# Patient Record
Sex: FEMALE | Race: WHITE | Employment: FULL TIME | URBAN - METROPOLITAN AREA
[De-identification: names, ages, dates, MRNs, and addresses within clinical notes are randomized per-mention and may not be internally consistent; named-entity substitution may affect disease eponyms.]

---

## 2022-04-18 LAB
ANTIBODY SCREEN, EXTERNAL: NEGATIVE
CHLAMYDIA, EXTERNAL: NEGATIVE
HBSAG, EXTERNAL: NEGATIVE
HEPATITIS C AB,   EXT: NON REACTIVE
HGB, EXTERNAL: 1
HIV, EXTERNAL: NON REACTIVE
N. GONORRHEA, EXTERNAL: NEGATIVE
PAP SMEAR, EXTERNAL: NORMAL
RUBELLA, EXTERNAL: NORMAL
T. PALLIDUM, EXTERNAL: NON REACTIVE
TYPE, ABO & RH, EXTERNAL: NORMAL

## 2022-10-17 LAB — GRBS, EXTERNAL: NEGATIVE

## 2022-11-16 ENCOUNTER — HOSPITAL ENCOUNTER (INPATIENT)
Age: 32
LOS: 3 days | Discharge: HOME | End: 2022-11-19
Attending: STUDENT IN AN ORGANIZED HEALTH CARE EDUCATION/TRAINING PROGRAM | Admitting: OBSTETRICS & GYNECOLOGY
Payer: COMMERCIAL

## 2022-11-16 PROBLEM — O48.0 POST-DATES PREGNANCY: Status: ACTIVE | Noted: 2022-11-16

## 2022-11-16 LAB
AMPHET UR QL SCN: NEGATIVE
BARBITURATES UR QL SCN: NEGATIVE
BASOPHILS # BLD: 0 K/UL (ref 0–0.1)
BASOPHILS NFR BLD: 0 % (ref 0–1)
BENZODIAZ UR QL: NEGATIVE
CANNABINOIDS UR QL SCN: NEGATIVE
COCAINE UR QL SCN: NEGATIVE
DIFFERENTIAL METHOD BLD: ABNORMAL
DRUG SCRN COMMENT,DRGCM: NORMAL
EOSINOPHIL # BLD: 0 K/UL (ref 0–0.4)
EOSINOPHIL NFR BLD: 0 % (ref 0–7)
ERYTHROCYTE [DISTWIDTH] IN BLOOD BY AUTOMATED COUNT: 13 % (ref 11.5–14.5)
HCT VFR BLD AUTO: 34.4 % (ref 35–47)
HGB BLD-MCNC: 11.2 G/DL (ref 11.5–16)
IMM GRANULOCYTES # BLD AUTO: 0 K/UL (ref 0–0.04)
IMM GRANULOCYTES NFR BLD AUTO: 0 % (ref 0–0.5)
LYMPHOCYTES # BLD: 1.6 K/UL (ref 0.8–3.5)
LYMPHOCYTES NFR BLD: 16 % (ref 12–49)
MCH RBC QN AUTO: 27.2 PG (ref 26–34)
MCHC RBC AUTO-ENTMCNC: 32.6 G/DL (ref 30–36.5)
MCV RBC AUTO: 83.5 FL (ref 80–99)
METHADONE UR QL: NEGATIVE
MONOCYTES # BLD: 0.5 K/UL (ref 0–1)
MONOCYTES NFR BLD: 5 % (ref 5–13)
NEUTS SEG # BLD: 7.9 K/UL (ref 1.8–8)
NEUTS SEG NFR BLD: 79 % (ref 32–75)
NRBC # BLD: 0 K/UL (ref 0–0.01)
NRBC BLD-RTO: 0 PER 100 WBC
OPIATES UR QL: NEGATIVE
PCP UR QL: NEGATIVE
PLATELET # BLD AUTO: 146 K/UL (ref 150–400)
PMV BLD AUTO: 12.7 FL (ref 8.9–12.9)
RBC # BLD AUTO: 4.12 M/UL (ref 3.8–5.2)
WBC # BLD AUTO: 10.1 K/UL (ref 3.6–11)

## 2022-11-16 PROCEDURE — 86923 COMPATIBILITY TEST ELECTRIC: CPT

## 2022-11-16 PROCEDURE — 36415 COLL VENOUS BLD VENIPUNCTURE: CPT

## 2022-11-16 PROCEDURE — 86900 BLOOD TYPING SEROLOGIC ABO: CPT

## 2022-11-16 PROCEDURE — 74011250637 HC RX REV CODE- 250/637: Performed by: STUDENT IN AN ORGANIZED HEALTH CARE EDUCATION/TRAINING PROGRAM

## 2022-11-16 PROCEDURE — 80307 DRUG TEST PRSMV CHEM ANLYZR: CPT

## 2022-11-16 PROCEDURE — 75410000002 HC LABOR FEE PER 1 HR

## 2022-11-16 PROCEDURE — 85025 COMPLETE CBC W/AUTO DIFF WBC: CPT

## 2022-11-16 PROCEDURE — 65410000002 HC RM PRIVATE OB

## 2022-11-16 RX ORDER — SODIUM CHLORIDE 0.9 % (FLUSH) 0.9 %
5-40 SYRINGE (ML) INJECTION EVERY 8 HOURS
Status: DISCONTINUED | OUTPATIENT
Start: 2022-11-16 | End: 2022-11-17 | Stop reason: SDUPTHER

## 2022-11-16 RX ORDER — SODIUM CHLORIDE 0.9 % (FLUSH) 0.9 %
5-40 SYRINGE (ML) INJECTION AS NEEDED
Status: DISCONTINUED | OUTPATIENT
Start: 2022-11-16 | End: 2022-11-17 | Stop reason: SDUPTHER

## 2022-11-16 RX ORDER — ACETAMINOPHEN 325 MG/1
650 TABLET ORAL
Status: DISCONTINUED | OUTPATIENT
Start: 2022-11-16 | End: 2022-11-17 | Stop reason: SDUPTHER

## 2022-11-16 RX ORDER — LORAZEPAM 0.5 MG/1
0.5 TABLET ORAL ONCE
Status: COMPLETED | OUTPATIENT
Start: 2022-11-16 | End: 2022-11-16

## 2022-11-16 RX ORDER — OXYTOCIN/RINGER'S LACTATE 30/500 ML
87.3 PLASTIC BAG, INJECTION (ML) INTRAVENOUS AS NEEDED
Status: DISCONTINUED | OUTPATIENT
Start: 2022-11-16 | End: 2022-11-17 | Stop reason: SDUPTHER

## 2022-11-16 RX ORDER — OXYTOCIN/RINGER'S LACTATE 30/500 ML
10 PLASTIC BAG, INJECTION (ML) INTRAVENOUS AS NEEDED
Status: DISCONTINUED | OUTPATIENT
Start: 2022-11-16 | End: 2022-11-17 | Stop reason: SDUPTHER

## 2022-11-16 RX ORDER — ONDANSETRON 2 MG/ML
4 INJECTION INTRAMUSCULAR; INTRAVENOUS
Status: DISCONTINUED | OUTPATIENT
Start: 2022-11-16 | End: 2022-11-18

## 2022-11-16 RX ADMIN — LORAZEPAM 0.5 MG: 0.5 TABLET ORAL at 21:10

## 2022-11-16 RX ADMIN — Medication 25 MCG: at 21:03

## 2022-11-17 ENCOUNTER — ANESTHESIA (OUTPATIENT)
Dept: LABOR AND DELIVERY | Age: 32
End: 2022-11-17
Payer: COMMERCIAL

## 2022-11-17 ENCOUNTER — ANESTHESIA EVENT (OUTPATIENT)
Dept: LABOR AND DELIVERY | Age: 32
End: 2022-11-17
Payer: COMMERCIAL

## 2022-11-17 LAB
BASOPHILS # BLD: 0 K/UL (ref 0–0.1)
BASOPHILS NFR BLD: 0 % (ref 0–1)
DIFFERENTIAL METHOD BLD: ABNORMAL
EOSINOPHIL # BLD: 0 K/UL (ref 0–0.4)
EOSINOPHIL NFR BLD: 0 % (ref 0–7)
ERYTHROCYTE [DISTWIDTH] IN BLOOD BY AUTOMATED COUNT: 13 % (ref 11.5–14.5)
HCT VFR BLD AUTO: 31 % (ref 35–47)
HGB BLD-MCNC: 10.2 G/DL (ref 11.5–16)
HISTORY CHECKED?,CKHIST: NORMAL
IMM GRANULOCYTES # BLD AUTO: 0.1 K/UL (ref 0–0.04)
IMM GRANULOCYTES NFR BLD AUTO: 1 % (ref 0–0.5)
LYMPHOCYTES # BLD: 1.1 K/UL (ref 0.8–3.5)
LYMPHOCYTES NFR BLD: 10 % (ref 12–49)
MCH RBC QN AUTO: 27.8 PG (ref 26–34)
MCHC RBC AUTO-ENTMCNC: 32.9 G/DL (ref 30–36.5)
MCV RBC AUTO: 84.5 FL (ref 80–99)
MONOCYTES # BLD: 0.4 K/UL (ref 0–1)
MONOCYTES NFR BLD: 4 % (ref 5–13)
NEUTS SEG # BLD: 9.1 K/UL (ref 1.8–8)
NEUTS SEG NFR BLD: 86 % (ref 32–75)
NRBC # BLD: 0 K/UL (ref 0–0.01)
NRBC BLD-RTO: 0 PER 100 WBC
PLATELET # BLD AUTO: 131 K/UL (ref 150–400)
RBC # BLD AUTO: 3.67 M/UL (ref 3.8–5.2)
WBC # BLD AUTO: 10.6 K/UL (ref 3.6–11)

## 2022-11-17 PROCEDURE — 0HQ9XZZ REPAIR PERINEUM SKIN, EXTERNAL APPROACH: ICD-10-PCS | Performed by: STUDENT IN AN ORGANIZED HEALTH CARE EDUCATION/TRAINING PROGRAM

## 2022-11-17 PROCEDURE — 3E033VJ INTRODUCTION OF OTHER HORMONE INTO PERIPHERAL VEIN, PERCUTANEOUS APPROACH: ICD-10-PCS | Performed by: STUDENT IN AN ORGANIZED HEALTH CARE EDUCATION/TRAINING PROGRAM

## 2022-11-17 PROCEDURE — 00HU33Z INSERTION OF INFUSION DEVICE INTO SPINAL CANAL, PERCUTANEOUS APPROACH: ICD-10-PCS | Performed by: ANESTHESIOLOGY

## 2022-11-17 PROCEDURE — 74011000250 HC RX REV CODE- 250: Performed by: STUDENT IN AN ORGANIZED HEALTH CARE EDUCATION/TRAINING PROGRAM

## 2022-11-17 PROCEDURE — 76060000078 HC EPIDURAL ANESTHESIA

## 2022-11-17 PROCEDURE — 3E0P7VZ INTRODUCTION OF HORMONE INTO FEMALE REPRODUCTIVE, VIA NATURAL OR ARTIFICIAL OPENING: ICD-10-PCS | Performed by: STUDENT IN AN ORGANIZED HEALTH CARE EDUCATION/TRAINING PROGRAM

## 2022-11-17 PROCEDURE — 85025 COMPLETE CBC W/AUTO DIFF WBC: CPT

## 2022-11-17 PROCEDURE — 74011000250 HC RX REV CODE- 250

## 2022-11-17 PROCEDURE — 75410000000 HC DELIVERY VAGINAL/SINGLE

## 2022-11-17 PROCEDURE — 10907ZC DRAINAGE OF AMNIOTIC FLUID, THERAPEUTIC FROM PRODUCTS OF CONCEPTION, VIA NATURAL OR ARTIFICIAL OPENING: ICD-10-PCS | Performed by: STUDENT IN AN ORGANIZED HEALTH CARE EDUCATION/TRAINING PROGRAM

## 2022-11-17 PROCEDURE — 74011250637 HC RX REV CODE- 250/637: Performed by: STUDENT IN AN ORGANIZED HEALTH CARE EDUCATION/TRAINING PROGRAM

## 2022-11-17 PROCEDURE — 75410000003 HC RECOV DEL/VAG/CSECN EA 0.5 HR

## 2022-11-17 PROCEDURE — 65410000002 HC RM PRIVATE OB

## 2022-11-17 PROCEDURE — 74011250636 HC RX REV CODE- 250/636: Performed by: STUDENT IN AN ORGANIZED HEALTH CARE EDUCATION/TRAINING PROGRAM

## 2022-11-17 PROCEDURE — 36415 COLL VENOUS BLD VENIPUNCTURE: CPT

## 2022-11-17 PROCEDURE — 74011250636 HC RX REV CODE- 250/636: Performed by: ANESTHESIOLOGY

## 2022-11-17 PROCEDURE — 74011250636 HC RX REV CODE- 250/636: Performed by: OBSTETRICS & GYNECOLOGY

## 2022-11-17 PROCEDURE — 77030014125 HC TY EPDRL BBMI -B: Performed by: ANESTHESIOLOGY

## 2022-11-17 PROCEDURE — 74011000250 HC RX REV CODE- 250: Performed by: ANESTHESIOLOGY

## 2022-11-17 PROCEDURE — 75410000002 HC LABOR FEE PER 1 HR

## 2022-11-17 RX ORDER — SODIUM CHLORIDE 9 MG/ML
250 INJECTION, SOLUTION INTRAVENOUS AS NEEDED
Status: DISCONTINUED | OUTPATIENT
Start: 2022-11-17 | End: 2022-11-18

## 2022-11-17 RX ORDER — BUPIVACAINE HYDROCHLORIDE AND EPINEPHRINE 2.5; 5 MG/ML; UG/ML
INJECTION, SOLUTION EPIDURAL; INFILTRATION; INTRACAUDAL; PERINEURAL
Status: COMPLETED
Start: 2022-11-17 | End: 2022-11-17

## 2022-11-17 RX ORDER — HYDROCORTISONE ACETATE PRAMOXINE HCL 2.5; 1 G/100G; G/100G
CREAM TOPICAL AS NEEDED
Status: DISCONTINUED | OUTPATIENT
Start: 2022-11-17 | End: 2022-11-19 | Stop reason: HOSPADM

## 2022-11-17 RX ORDER — BUPIVACAINE HYDROCHLORIDE AND EPINEPHRINE 2.5; 5 MG/ML; UG/ML
INJECTION, SOLUTION EPIDURAL; INFILTRATION; INTRACAUDAL; PERINEURAL AS NEEDED
Status: DISCONTINUED | OUTPATIENT
Start: 2022-11-17 | End: 2022-11-17 | Stop reason: HOSPADM

## 2022-11-17 RX ORDER — DOCUSATE SODIUM 100 MG/1
100 CAPSULE, LIQUID FILLED ORAL
Status: DISCONTINUED | OUTPATIENT
Start: 2022-11-17 | End: 2022-11-19 | Stop reason: HOSPADM

## 2022-11-17 RX ORDER — OXYTOCIN/RINGER'S LACTATE 30/500 ML
0-20 PLASTIC BAG, INJECTION (ML) INTRAVENOUS
Status: DISCONTINUED | OUTPATIENT
Start: 2022-11-17 | End: 2022-11-17 | Stop reason: SDUPTHER

## 2022-11-17 RX ORDER — ACETAMINOPHEN 325 MG/1
650 TABLET ORAL
Status: DISCONTINUED | OUTPATIENT
Start: 2022-11-17 | End: 2022-11-19 | Stop reason: HOSPADM

## 2022-11-17 RX ORDER — DIPHENOXYLATE HYDROCHLORIDE AND ATROPINE SULFATE 2.5; .025 MG/1; MG/1
2 TABLET ORAL
Status: DISCONTINUED | OUTPATIENT
Start: 2022-11-17 | End: 2022-11-18

## 2022-11-17 RX ORDER — OXYTOCIN/RINGER'S LACTATE 30/500 ML
10 PLASTIC BAG, INJECTION (ML) INTRAVENOUS AS NEEDED
Status: DISCONTINUED | OUTPATIENT
Start: 2022-11-17 | End: 2022-11-17 | Stop reason: SDUPTHER

## 2022-11-17 RX ORDER — METHYLERGONOVINE MALEATE 0.2 MG/ML
0.2 INJECTION INTRAVENOUS ONCE
Status: COMPLETED | OUTPATIENT
Start: 2022-11-17 | End: 2022-11-17

## 2022-11-17 RX ORDER — FENTANYL/BUPIVACAINE/NS/PF 2-1250MCG
PREFILLED PUMP RESERVOIR EPIDURAL
Status: COMPLETED
Start: 2022-11-17 | End: 2022-11-17

## 2022-11-17 RX ORDER — SODIUM CHLORIDE 0.9 % (FLUSH) 0.9 %
5-40 SYRINGE (ML) INJECTION AS NEEDED
Status: DISCONTINUED | OUTPATIENT
Start: 2022-11-17 | End: 2022-11-18

## 2022-11-17 RX ORDER — TRANEXAMIC ACID 100 MG/ML
1 INJECTION, SOLUTION INTRAVENOUS ONCE
Status: COMPLETED | OUTPATIENT
Start: 2022-11-17 | End: 2022-11-17

## 2022-11-17 RX ORDER — OXYCODONE HYDROCHLORIDE 5 MG/1
5 TABLET ORAL
Status: DISCONTINUED | OUTPATIENT
Start: 2022-11-17 | End: 2022-11-19 | Stop reason: HOSPADM

## 2022-11-17 RX ORDER — CARBOPROST TROMETHAMINE 250 UG/ML
250 INJECTION, SOLUTION INTRAMUSCULAR ONCE
Status: COMPLETED | OUTPATIENT
Start: 2022-11-17 | End: 2022-11-17

## 2022-11-17 RX ORDER — OXYTOCIN/RINGER'S LACTATE 30/500 ML
10 PLASTIC BAG, INJECTION (ML) INTRAVENOUS AS NEEDED
Status: DISCONTINUED | OUTPATIENT
Start: 2022-11-17 | End: 2022-11-19 | Stop reason: HOSPADM

## 2022-11-17 RX ORDER — FENTANYL/BUPIVACAINE/NS/PF 2-1250MCG
1-16 PREFILLED PUMP RESERVOIR EPIDURAL CONTINUOUS
Status: DISCONTINUED | OUTPATIENT
Start: 2022-11-17 | End: 2022-11-18

## 2022-11-17 RX ORDER — SODIUM CHLORIDE, SODIUM LACTATE, POTASSIUM CHLORIDE, CALCIUM CHLORIDE 600; 310; 30; 20 MG/100ML; MG/100ML; MG/100ML; MG/100ML
125 INJECTION, SOLUTION INTRAVENOUS CONTINUOUS
Status: DISCONTINUED | OUTPATIENT
Start: 2022-11-17 | End: 2022-11-18

## 2022-11-17 RX ORDER — SODIUM CHLORIDE 0.9 % (FLUSH) 0.9 %
5 SYRINGE (ML) INJECTION AS NEEDED
Status: DISCONTINUED | OUTPATIENT
Start: 2022-11-17 | End: 2022-11-18

## 2022-11-17 RX ORDER — ZOLPIDEM TARTRATE 5 MG/1
5 TABLET ORAL
Status: DISCONTINUED | OUTPATIENT
Start: 2022-11-17 | End: 2022-11-19 | Stop reason: HOSPADM

## 2022-11-17 RX ORDER — DIPHENHYDRAMINE HCL 25 MG
25 CAPSULE ORAL
Status: DISCONTINUED | OUTPATIENT
Start: 2022-11-17 | End: 2022-11-19 | Stop reason: HOSPADM

## 2022-11-17 RX ORDER — OXYTOCIN/RINGER'S LACTATE 30/500 ML
87.3 PLASTIC BAG, INJECTION (ML) INTRAVENOUS AS NEEDED
Status: DISCONTINUED | OUTPATIENT
Start: 2022-11-17 | End: 2022-11-17 | Stop reason: SDUPTHER

## 2022-11-17 RX ORDER — IBUPROFEN 400 MG/1
800 TABLET ORAL EVERY 8 HOURS
Status: DISCONTINUED | OUTPATIENT
Start: 2022-11-17 | End: 2022-11-19 | Stop reason: HOSPADM

## 2022-11-17 RX ORDER — FENTANYL CITRATE 50 UG/ML
INJECTION, SOLUTION INTRAMUSCULAR; INTRAVENOUS AS NEEDED
Status: DISCONTINUED | OUTPATIENT
Start: 2022-11-17 | End: 2022-11-17 | Stop reason: HOSPADM

## 2022-11-17 RX ORDER — NALOXONE HYDROCHLORIDE 0.4 MG/ML
0.4 INJECTION, SOLUTION INTRAMUSCULAR; INTRAVENOUS; SUBCUTANEOUS AS NEEDED
Status: DISCONTINUED | OUTPATIENT
Start: 2022-11-17 | End: 2022-11-19 | Stop reason: HOSPADM

## 2022-11-17 RX ORDER — SODIUM CHLORIDE 0.9 % (FLUSH) 0.9 %
5-40 SYRINGE (ML) INJECTION EVERY 8 HOURS
Status: DISCONTINUED | OUTPATIENT
Start: 2022-11-17 | End: 2022-11-19 | Stop reason: HOSPADM

## 2022-11-17 RX ORDER — ONDANSETRON 4 MG/1
4 TABLET, ORALLY DISINTEGRATING ORAL
Status: ACTIVE | OUTPATIENT
Start: 2022-11-17 | End: 2022-11-18

## 2022-11-17 RX ORDER — OXYTOCIN/RINGER'S LACTATE 30/500 ML
87.3 PLASTIC BAG, INJECTION (ML) INTRAVENOUS AS NEEDED
Status: DISCONTINUED | OUTPATIENT
Start: 2022-11-17 | End: 2022-11-19 | Stop reason: HOSPADM

## 2022-11-17 RX ADMIN — SODIUM CHLORIDE, POTASSIUM CHLORIDE, SODIUM LACTATE AND CALCIUM CHLORIDE 125 ML/HR: 600; 310; 30; 20 INJECTION, SOLUTION INTRAVENOUS at 05:30

## 2022-11-17 RX ADMIN — Medication 12 ML/HR: at 07:12

## 2022-11-17 RX ADMIN — ONDANSETRON HYDROCHLORIDE 4 MG: 2 INJECTION, SOLUTION INTRAMUSCULAR; INTRAVENOUS at 12:35

## 2022-11-17 RX ADMIN — BUPIVACAINE HYDROCHLORIDE AND EPINEPHRINE 3 ML: 2.5; 5 INJECTION, SOLUTION EPIDURAL; INFILTRATION; INTRACAUDAL; PERINEURAL at 07:07

## 2022-11-17 RX ADMIN — DIPHENOXYLATE HYDROCHLORIDE AND ATROPINE SULFATE 1 TABLET: 2.5; .025 TABLET ORAL at 12:40

## 2022-11-17 RX ADMIN — BUPIVACAINE HYDROCHLORIDE AND EPINEPHRINE 0.5 ML: 2.5; 5 INJECTION, SOLUTION EPIDURAL; INFILTRATION; INTRACAUDAL; PERINEURAL at 07:05

## 2022-11-17 RX ADMIN — CARBOPROST TROMETHAMINE 250 MCG: 250 INJECTION, SOLUTION INTRAMUSCULAR at 12:24

## 2022-11-17 RX ADMIN — ACETAMINOPHEN 650 MG: 325 TABLET ORAL at 20:48

## 2022-11-17 RX ADMIN — BUPIVACAINE HYDROCHLORIDE AND EPINEPHRINE 3 ML: 2.5; 5 INJECTION, SOLUTION EPIDURAL; INFILTRATION; INTRACAUDAL; PERINEURAL at 07:06

## 2022-11-17 RX ADMIN — IBUPROFEN 800 MG: 400 TABLET, FILM COATED ORAL at 19:06

## 2022-11-17 RX ADMIN — METHYLERGONOVINE MALEATE 0.2 MG: 0.2 INJECTION, SOLUTION INTRAMUSCULAR; INTRAVENOUS at 12:22

## 2022-11-17 RX ADMIN — FENTANYL CITRATE 100 MCG: 50 INJECTION, SOLUTION INTRAMUSCULAR; INTRAVENOUS at 07:07

## 2022-11-17 RX ADMIN — TRANEXAMIC ACID 1000 MG: 1 INJECTION, SOLUTION INTRAVENOUS at 12:23

## 2022-11-17 RX ADMIN — Medication 25 MCG: at 00:11

## 2022-11-17 RX ADMIN — OXYTOCIN 1 MILLI-UNITS/MIN: 10 INJECTION INTRAVENOUS at 05:32

## 2022-11-17 NOTE — ANESTHESIA PREPROCEDURE EVALUATION
Relevant Problems   No relevant active problems       Anesthetic History   No history of anesthetic complications            Review of Systems / Medical History  Patient summary reviewed, nursing notes reviewed and pertinent labs reviewed    Pulmonary  Within defined limits                 Neuro/Psych             Comments: Scoliosis Cardiovascular  Within defined limits                Exercise tolerance: >4 METS     GI/Hepatic/Renal  Within defined limits              Endo/Other            Comments:  Thrombocytopenia (Platelets = 192F on 62/37/39) Other Findings   Comments: IUP         Physical Exam    Airway  Mallampati: I  TM Distance: 4 - 6 cm  Neck ROM: normal range of motion   Mouth opening: Normal     Cardiovascular  Regular rate and rhythm,  S1 and S2 normal,  no murmur, click, rub, or gallop             Dental  No notable dental hx       Pulmonary  Breath sounds clear to auscultation               Abdominal  GI exam deferred       Other Findings            Anesthetic Plan    ASA: 1  Anesthesia type: CSE            Anesthetic plan and risks discussed with: Patient

## 2022-11-17 NOTE — PROGRESS NOTES
Labor Progress Note  Patient seen, fetal heart rate and contraction pattern evaluated. Physical Exam:  Visit Vitals  /81   Pulse 80   Temp 98.2 °F (36.8 °C)   Resp 16   Ht 5' 3\" (1.6 m)   Wt 63.5 kg (140 lb)   SpO2 100%   BMI 24.80 kg/m²     Cervical Exam: 5/90/0, mid  Membranes: s/p AROM- clear  Uterine Activity: Frequency: 2-3 minutes  Fetal Heart Rate: 130's,  adequate variability and reactivity  Accelerations: yes  Decelerations: none    Assessment/Plan: 27 y/o G1 @ 40w6d for induction of labor  IUP- category 1, continue to monitor, position change as needed  2.  Induction- s/p cytotec, on pitocin, s/p AROM, epidural    Devon Joe MD

## 2022-11-17 NOTE — PROGRESS NOTES
Labor Progress Note  Patient seen, fetal heart rate and contraction pattern evaluated. Physical Exam:  Visit Vitals  /88   Pulse 78   Temp 98.6 °F (37 °C)   Resp 16   Ht 5' 3\" (1.6 m)   Wt 63.5 kg (140 lb)   SpO2 98%   BMI 24.80 kg/m²     Cervical Exam: 3/90/-2, mid  Membranes:  AROM- clear  Uterine Activity: Frequency: 2-3 minutes  Fetal Heart Rate: 130's,  adequate variability and reactivity  Accelerations: yes  Decelerations: variable, not repetitive, one late deceleration    Assessment/Plan: 27 y/o G1 @ 40w6d for induction of labor  IUP- category 2, continue to monitor, position change as needed    2.  Induction- s/p cytotec, pitocin started, AROM, pain management options mentioned

## 2022-11-17 NOTE — ANESTHESIA PROCEDURE NOTES
CSE Block    Start time: 11/17/2022 7:00 AM  End time: 11/17/2022 7:09 AM  Performed by: Shannon Oreilly MD  Authorized by: Shannon Oreilly MD     Pre-Procedure  Indications: at surgeon's request and procedure for pain    preanesthetic checklist: patient identified, risks and benefits discussed, anesthesia consent, site marked, patient being monitored and timeout performed    Timeout Time: 07:00 EST      Procedure:   Patient Position:  Seated  Prep Region:  Lumbar  Prep: DuraPrep    Location:  L3-4    Epidural Needle:   Needle Type:  Tuohy  Needle Gauge:  17 G  Injection Technique:  Loss of resistance using saline  Attempts:  1    Spinal Needle:   Needle Type:   Justin  Needle Gauge:  25 G    Catheter:   Catheter Type:  Flex-tip  Catheter Size:  19 G  Catheter at Skin Depth (cm):  8  Depth in Epidural Space (cm):  4  Events: no blood with aspiration, no cerebrospinal fluid with aspiration, no paresthesia and negative aspiration test    Med Admin time: 11/17/2022 7:12 AM    Assessment:   Catheter Secured:  Tegaderm and tape  Insertion:  Uncomplicated  Patient tolerance:  Patient tolerated the procedure well with no immediate complications

## 2022-11-17 NOTE — PROGRESS NOTES
Responded to code [address] as rapid response nurse. Called for support by primary team.   7232 No interventions needed by RRT.  Dismissed by primary care team .

## 2022-11-17 NOTE — H&P
OB History & Physical    Name: Bernadette Odom MRN: [id number]  SSN: [id number]    YOB: 1990  Age: 28 y.o. Sex: female      Subjective:   Chief complaint:post dates, induction    History of Present Illness: Bernadette Odom is a 28 y.o.  female with an estimated gestational age of 37wks. She has no concerns, reports uncomplicated pregnancy save some mild anemia. Has induction plan. OB History          1    Para        Term                AB        Living             SAB        IAB        Ectopic        Molar        Multiple        Live Births                  No past medical history on file. - anemia  No past surgical history on file. - denies  Social History     Occupational History    Not on file   Tobacco Use    Smoking status: Not on file    Smokeless tobacco: Not on file   Substance and Sexual Activity    Alcohol use: Not on file    Drug use: Not on file    Sexual activity: Not on file   Occupation- physician   No family history on file. No Known Allergies  Prior to Admission medications    Medication Sig Start Date End Date Taking? Authorizing Provider   prenatal vit-iron fumarate-fa 27 mg iron- 0.8 mg tab tablet Take 1 Tablet by mouth daily. Yes Provider, Historical        Review of Systems    Objective:     Vitals: There were no vitals filed for this visit. No data recorded.     No data recorded     Physical Exam  General: in NAD  HEENT: normocephalic  Abdomen: Gravid  Pelvic:Cervical Exam: not checked- 3/90 in the office  Uterine Activity: occasional, contractions detected  Fetal Heart Rate: 120's adequate variability and reactivity; no significant abnormal decelerations    Labs:   Recent Results (from the past 24 hour(s))   CBC WITH AUTOMATED DIFF    Collection Time: 22  8:34 PM   Result Value Ref Range    WBC 10.1 3.6 - 11.0 K/uL    RBC 4.12 3.80 - 5.20 M/uL    HGB 11.2 (L) 11.5 - 16.0 g/dL    HCT 34.4 (L) 35.0 - 47.0 %    MCV 83.5 80.0 - 99.0 FL MCH 27.2 26.0 - 34.0 PG    MCHC 32.6 30.0 - 36.5 g/dL    RDW 13.0 11.5 - 14.5 %    PLATELET 046 (L) 769 - 400 K/uL    MPV 12.7 8.9 - 12.9 FL    NRBC 0.0 0  WBC    ABSOLUTE NRBC 0.00 0.00 - 0.01 K/uL    NEUTROPHILS 79 (H) 32 - 75 %    LYMPHOCYTES 16 12 - 49 %    MONOCYTES 5 5 - 13 %    EOSINOPHILS 0 0 - 7 %    BASOPHILS 0 0 - 1 %    IMMATURE GRANULOCYTES 0 0.0 - 0.5 %    ABS. NEUTROPHILS 7.9 1.8 - 8.0 K/UL    ABS. LYMPHOCYTES 1.6 0.8 - 3.5 K/UL    ABS. MONOCYTES 0.5 0.0 - 1.0 K/UL    ABS. EOSINOPHILS 0.0 0.0 - 0.4 K/UL    ABS. BASOPHILS 0.0 0.0 - 0.1 K/UL    ABS. IMM. GRANS. 0.0 0.00 - 0.04 K/UL    DF AUTOMATED         Patient Active Problem List   Diagnosis Code    Post-dates pregnancy O48.0     Assessment and Plan:     29 y/o G1 @ 40wks for induction of labor    IUP- category 1      2. Induction of labor- plan is cytotec vaginally and AROM in the morning. States vertex.    Signed By:  Tejal Young MD     November 16, 2022

## 2022-11-17 NOTE — PROGRESS NOTES
0710 Assumed care of pt at this time from [address] [place], [place] [address]FRANKLIN Dr. at bedside to see pt. SVE = 5/100/0.    3009 Dr. Kaiden Wang at bedside , SVE = c/c.    1015 Dr. Kaiden Wang at bedside, discussing laboring down, pt agreeable. 21  Dr. Kaiden Wang at bedside, SVE by MD. Per MD, will begin pushing. MD remains at bedside for entire pushing process until delivery. 1201  of viable female infant, apgars 8/9.    1217 Placenta delivered at this time, inverted uterus identified and verbalized by MD immediately. This RN called for additional nurses and Dr. Palma Humphreys assistance. 1218 Dr. Dhara Amador. DWAINE Gaona. Phong Domínguez, RN, Broderick Craft, SANDRA at bedside. 1235 Per MD, only need to send CBC at this time. 1510 Bedside shift change report given to MEI Dalton RN (oncoming nurse) by VALENTE Gonzalez RN (offgoing nurse). Report included the following information SBAR, Kardex, Intake/Output, MAR, and Recent Results.

## 2022-11-17 NOTE — PROGRESS NOTES
Lucius Maxwell is a 28 y.o.  at 40w5d patient of Dr Juan Manuel Szymanski at [facility] who presents to L&D for scheduled IOL for Elective. She reports Positive FM, denies vaginal bleeding, LOF, and contractions. She also denies Headaches, Scotoma, RUQ pain, and Edema. Urine sample obtained. EFM and toco placed for initial assessment. Pt states she has induction plan agreed upon with Dr Juan Manuel Szymanski. Dr Reynold Rivera aware. POC cytotec vaginally q4h and AROM in am     0530: pitocin titration started    0543: Dr Reynold Rivera at bedside. Strip reviewed. SVE, 3/90/-2. AROM with large amount of clear fluid      2660-5975: pt c/o severe pain, unable to sit still. Difficulty monitoring FHR due to pain levels. RN remained at bedside. 0700  Anesthesia at bedside. Patient positioned to side of the bed, EFM & TOCO adjusted to accommodate sterile field. Difficulty tracing due to maternal position. Time out completed at 0701. Successful epidural is completed by Dr Damion Dela Cruz. Patient is repositioned supine in bed with wedge under left hip. EFM and TOCO replaced and blood pressure frequency increased. Torres catheter placed and epidural continuous infusion is started. 0710: Bedside and Verbal shift change report given to ARSLAN Gonzalez RN (oncoming nurse) by Mahad Lira RN (offgoing nurse). Report included the following information SBAR, Kardex, Procedure Summary, Intake/Output, MAR, and Recent Results.

## 2022-11-18 LAB
ERYTHROCYTE [DISTWIDTH] IN BLOOD BY AUTOMATED COUNT: 13.1 % (ref 11.5–14.5)
HCT VFR BLD AUTO: 23.2 % (ref 35–47)
HGB BLD-MCNC: 7.8 G/DL (ref 11.5–16)
MCH RBC QN AUTO: 28.3 PG (ref 26–34)
MCHC RBC AUTO-ENTMCNC: 33.6 G/DL (ref 30–36.5)
MCV RBC AUTO: 84.1 FL (ref 80–99)
NRBC # BLD: 0 K/UL (ref 0–0.01)
NRBC BLD-RTO: 0 PER 100 WBC
PLATELET # BLD AUTO: 108 K/UL (ref 150–400)
RBC # BLD AUTO: 2.76 M/UL (ref 3.8–5.2)
WBC # BLD AUTO: 11.7 K/UL (ref 3.6–11)

## 2022-11-18 PROCEDURE — 65410000002 HC RM PRIVATE OB

## 2022-11-18 PROCEDURE — 86900 BLOOD TYPING SEROLOGIC ABO: CPT

## 2022-11-18 PROCEDURE — 36415 COLL VENOUS BLD VENIPUNCTURE: CPT

## 2022-11-18 PROCEDURE — 85027 COMPLETE CBC AUTOMATED: CPT

## 2022-11-18 PROCEDURE — 85461 HEMOGLOBIN FETAL: CPT

## 2022-11-18 PROCEDURE — 74011250637 HC RX REV CODE- 250/637: Performed by: STUDENT IN AN ORGANIZED HEALTH CARE EDUCATION/TRAINING PROGRAM

## 2022-11-18 RX ADMIN — IBUPROFEN 800 MG: 400 TABLET, FILM COATED ORAL at 10:56

## 2022-11-18 RX ADMIN — IBUPROFEN 800 MG: 400 TABLET, FILM COATED ORAL at 02:55

## 2022-11-18 RX ADMIN — ACETAMINOPHEN 650 MG: 325 TABLET ORAL at 06:17

## 2022-11-18 RX ADMIN — ACETAMINOPHEN 650 MG: 325 TABLET ORAL at 21:05

## 2022-11-18 RX ADMIN — ACETAMINOPHEN 650 MG: 325 TABLET ORAL at 12:27

## 2022-11-18 RX ADMIN — ACETAMINOPHEN 650 MG: 325 TABLET ORAL at 17:00

## 2022-11-18 RX ADMIN — IBUPROFEN 800 MG: 400 TABLET, FILM COATED ORAL at 18:35

## 2022-11-18 NOTE — ROUTINE PROCESS
Bedside and Verbal shift change report given to BRIDGETTE Larson RN (oncoming nurse) by VALENTE Martinez RN (offgoing nurse). Report included the following information SBAR, Procedure Summary, Intake/Output, MAR, Recent Results, and Med Rec Status.

## 2022-11-18 NOTE — PROGRESS NOTES
TRANSFER - IN REPORT:    Verbal report received from MEI Dalton RN(name) on Kathryn Stallings  being received from L&D (unit) for routine progression of care      Report consisted of patients Situation, Background, Assessment and   Recommendations(SBAR). Information from the following report(s) SBAR, Procedure Summary, Intake/Output, MAR, Recent Results, and Med Rec Status was reviewed with the receiving nurse. Opportunity for questions and clarification was provided. Assessment completed upon patients arrival to unit and care assumed.

## 2022-11-18 NOTE — PROGRESS NOTES
Post-Partum Day Number 1 Progress Note    Gabridgette Robles     Assessment: Doing well, post partum day 1 sp TSVD after IOL, c/b uterine inversion and PPH, EBL 1000cc    Plan:  - Continue routine postpartum and perineal care as well as maternal education. - uterine inversion/pph - uterus replaced manually. Atony resolved with uterotonics and bimanual massage. Hgb 11.2-7.8. asx and vitals stable. declines IV iron. Plan po iron on dc. - rh neg - rhogam prior to dc  - Plan discharge home [address] Discharge Date: Tomorrow. Information for the patient's :  Ruth Patricia [[id number]]   Vaginal, Spontaneous  Patient doing well without significant complaint. Voiding without difficulty, normal lochia. Vitals:  Visit Vitals  /83 (BP 1 Location: Left upper arm, BP Patient Position: At rest)   Pulse 81   Temp 97.8 °F (36.6 °C)   Resp 16   Ht 5' 3\" (1.6 m)   Wt 63.5 kg (140 lb)   SpO2 100%   Breastfeeding Unknown   BMI 24.80 kg/m²     Temp (24hrs), Av.2 °F (36.8 °C), Min:97.8 °F (36.6 °C), Max:98.6 °F (37 °C)        Exam:   Patient without distress. Fundus firm, nontender per nursing fundal checks. Perineum with normal lochia noted per nursing assessment. Lower extremities are negative for pathological edema.     Labs:     Lab Results   Component Value Date/Time    WBC 11.7 (H) 2022 02:57 AM    WBC 10.6 2022 12:39 PM    WBC 10.1 2022 08:34 PM    HGB 7.8 (L) 2022 02:57 AM    HGB 10.2 (L) 2022 12:39 PM    HGB 11.2 (L) 2022 08:34 PM    HCT 23.2 (L) 2022 02:57 AM    HCT 31.0 (L) 2022 12:39 PM    HCT 34.4 (L) 2022 08:34 PM    PLATELET 411 (L)  02:57 AM    PLATELET 042 (L)  12:39 PM    PLATELET 109 (L)  08:34 PM    Hgb, External 1 2022 12:00 AM       Recent Results (from the past 24 hour(s))   RBC, ALLOCATE    Collection Time: 22 12:30 PM   Result Value Ref Range HISTORY CHECKED? Historical check performed    CBC WITH AUTOMATED DIFF    Collection Time: 11/17/22 12:39 PM   Result Value Ref Range    WBC 10.6 3.6 - 11.0 K/uL    RBC 3.67 (L) 3.80 - 5.20 M/uL    HGB 10.2 (L) 11.5 - 16.0 g/dL    HCT 31.0 (L) 35.0 - 47.0 %    MCV 84.5 80.0 - 99.0 FL    MCH 27.8 26.0 - 34.0 PG    MCHC 32.9 30.0 - 36.5 g/dL    RDW 13.0 11.5 - 14.5 %    PLATELET 464 (L) 631 - 400 K/uL    NRBC 0.0 0  WBC    ABSOLUTE NRBC 0.00 0.00 - 0.01 K/uL    NEUTROPHILS 86 (H) 32 - 75 %    LYMPHOCYTES 10 (L) 12 - 49 %    MONOCYTES 4 (L) 5 - 13 %    EOSINOPHILS 0 0 - 7 %    BASOPHILS 0 0 - 1 %    IMMATURE GRANULOCYTES 1 (H) 0.0 - 0.5 %    ABS. NEUTROPHILS 9.1 (H) 1.8 - 8.0 K/UL    ABS. LYMPHOCYTES 1.1 0.8 - 3.5 K/UL    ABS. MONOCYTES 0.4 0.0 - 1.0 K/UL    ABS. EOSINOPHILS 0.0 0.0 - 0.4 K/UL    ABS. BASOPHILS 0.0 0.0 - 0.1 K/UL    ABS. IMM.  GRANS. 0.1 (H) 0.00 - 0.04 K/UL    DF AUTOMATED     RH IMMUNE GLOBULIN EVAL-LAB ORDER    Collection Time: 11/18/22  2:57 AM   Result Value Ref Range    ABO/Rh(D) O NEGATIVE     Fetal screen NEG     Unit number [id number]     Blood component type RH IMMUNE GLOBULIN     Unit division 00     Status of unit ALLOCATED    CBC W/O DIFF    Collection Time: 11/18/22  2:57 AM   Result Value Ref Range    WBC 11.7 (H) 3.6 - 11.0 K/uL    RBC 2.76 (L) 3.80 - 5.20 M/uL    HGB 7.8 (L) 11.5 - 16.0 g/dL    HCT 23.2 (L) 35.0 - 47.0 %    MCV 84.1 80.0 - 99.0 FL    MCH 28.3 26.0 - 34.0 PG    MCHC 33.6 30.0 - 36.5 g/dL    RDW 13.1 11.5 - 14.5 %    PLATELET 115 (L) 154 - 400 K/uL    NRBC 0.0 0  WBC    ABSOLUTE NRBC 0.00 0.00 - 0.01 K/uL

## 2022-11-18 NOTE — LACTATION NOTE
This note was copied from a baby's chart. 22 1145   Visit Information   Lactation Consult Visit Type IP Initial Consult   Visit Length 15 minutes   Reason for Visit Normal [place] Visit;Education   Breast- Feeding Assessment   Breast-Feeding Experience No  Equipment: Has a Spectra breast pump; Measured for 22-24mm flanges   Breast Assessment   Left Breast Medium  (Colostrum expressed)   Left Nipple Everted   Right Breast Medium   Right Nipple Everted   Mother/Infant Observation   Infant Observation Frenulum checked  (Oral assessment WDL)     Reviewed the \"Your Guide to Breastfeeding\" booklet. Discussed the typical feeding characteristics in the 1st and 2nd DOL and signs of adequate intake. Demonstrated hand expression. Discussed a feeding plan and mother's questions were addressed. Plan:  Offer lots of skin to skin and access to the breast.  Feed baby at early signs of hunger every 2-3 hours. Assure a deep latch, check that baby's lips are turned outward and use breast compression to keep baby actively feeding. Pump/hand express for poor feeds and offer baby EBM. Monitor wet and dirty diapers for signs of adequate intake.

## 2022-11-19 VITALS
BODY MASS INDEX: 24.8 KG/M2 | HEART RATE: 91 BPM | SYSTOLIC BLOOD PRESSURE: 130 MMHG | HEIGHT: 63 IN | RESPIRATION RATE: 14 BRPM | DIASTOLIC BLOOD PRESSURE: 85 MMHG | TEMPERATURE: 98.8 F | WEIGHT: 140 LBS | OXYGEN SATURATION: 97 %

## 2022-11-19 PROCEDURE — 74011250637 HC RX REV CODE- 250/637: Performed by: STUDENT IN AN ORGANIZED HEALTH CARE EDUCATION/TRAINING PROGRAM

## 2022-11-19 PROCEDURE — 74011250636 HC RX REV CODE- 250/636: Performed by: STUDENT IN AN ORGANIZED HEALTH CARE EDUCATION/TRAINING PROGRAM

## 2022-11-19 RX ORDER — FERROUS SULFATE 137(45) MG
142 TABLET, EXTENDED RELEASE ORAL
Qty: 90 TABLET | Refills: 0 | Status: SHIPPED | OUTPATIENT
Start: 2022-11-19

## 2022-11-19 RX ORDER — IBUPROFEN 800 MG/1
800 TABLET ORAL
Qty: 60 TABLET | Refills: 0 | Status: SHIPPED | OUTPATIENT
Start: 2022-11-19

## 2022-11-19 RX ADMIN — ACETAMINOPHEN 650 MG: 325 TABLET ORAL at 07:12

## 2022-11-19 RX ADMIN — HUMAN RHO(D) IMMUNE GLOBULIN 0.3 MG: 300 INJECTION, SOLUTION INTRAMUSCULAR at 09:15

## 2022-11-19 RX ADMIN — IBUPROFEN 800 MG: 400 TABLET, FILM COATED ORAL at 02:15

## 2022-11-19 NOTE — DISCHARGE INSTRUCTIONS
Postpartum: Care Instructions  Overview  After childbirth (postpartum period), your body goes through many changes. Some of these changes happen over several weeks. In the hours after delivery, your body will begin to recover from childbirth while it prepares to breastfeed your . You may feel emotional during this time. Your hormones can shift your mood without warning for no clear reason. In the first couple of weeks after childbirth, it's common to have emotions that change from happy to sad. You may find it hard to sleep. You may cry a lot. This is called the \"baby blues. \" These overwhelming emotions often go away within a couple of days or weeks. But it's important to discuss your feelings with your doctor. It's easy to get too tired and overwhelmed during the first weeks after childbirth. Don't try to do too much. Get rest whenever you can, accept help from others, and eat well and drink plenty of fluids. In the first couple of weeks after you give birth, your doctor or midwife may want to check in with you and make a plan for any follow-up care you may need. You will likely have a complete postpartum visit in the first 3 months after delivery. At that time, your doctor or midwife will check on your recovery from childbirth and see how you're doing with your emotions. You may also discuss your concerns or questions. Follow-up care is a key part of your treatment and safety. Be sure to make and go to all appointments, and call your doctor if you are having problems. It's also a good idea to know your test results and keep a list of the medicines you take. How can you care for yourself at home? Sleep or rest when your baby sleeps. Get help with household chores from family or friends, if you can. Don't try to do it all yourself. If you have hemorrhoids or swelling or pain around the opening of your vagina, try using cold and heat.  You can put ice or a cold pack on the area for 10 to 20 minutes at a time. Put a thin cloth between the ice and your skin. Also try sitting in a few inches of warm water (sitz bath) 3 times a day and after bowel movements. Take pain medicines exactly as directed. If the doctor gave you a prescription medicine for pain, take it as prescribed. If you are not taking a prescription pain medicine, ask your doctor if you can take an over-the-counter medicine. Eat more fiber to avoid constipation. Include foods such as whole-grain breads and cereals, raw vegetables, raw and dried fruits, and beans. Drink plenty of fluids. If you have kidney, heart, or liver disease and have to limit fluids, talk with your doctor before you increase the amount of fluids you drink. Do not rinse inside your vagina with fluids (douche). If you have stitches, keep the area clean by pouring or spraying warm water over the area outside your vagina and anus after you use the toilet. Keep a list of questions to ask your doctor or midwife. Your questions might be about:  Changes in your breasts, such as lumps or soreness. When to expect your menstrual period to start again. What form of birth control is best for you. Weight you have put on during the pregnancy. Exercise options. What foods and drinks are best for you, especially if you are breastfeeding. Problems you might be having with breastfeeding. When you can have sex. You may want to talk about lubricants for your vagina. Any feelings of sadness or restlessness that you are having. When should you call for help? Share this information with your partner, family, or a friend. They can help you watch for warning signs. Call 911  anytime you think you may need emergency care. For example, call if:    You have thoughts of harming yourself, your baby, or another person. You passed out (lost consciousness). You have chest pain, are short of breath, or cough up blood. You have a seizure.    Call your doctor now or seek immediate medical care if:    You have signs of hemorrhage (too much bleeding), such as:  Heavy vaginal bleeding. This means that you are soaking through one or more pads in an hour. Or you pass blood clots bigger than an egg. Feeling dizzy or lightheaded, or you feel like you may faint. Feeling so tired or weak that you cannot do your usual activities. A fast or irregular heartbeat. New or worse belly pain. You have signs of infection, such as:  A fever. Vaginal discharge that smells bad. New or worse belly pain. You have symptoms of a blood clot in your leg (called a deep vein thrombosis), such as:  Pain in the calf, back of the knee, thigh, or groin. Redness and swelling in your leg or groin. You have signs of preeclampsia, such as:  Sudden swelling of your face, hands, or feet. New vision problems (such as dimness, blurring, or seeing spots). A severe headache. Watch closely for changes in your health, and be sure to contact your doctor if:    Your vaginal bleeding isn't decreasing. You feel sad, anxious, or hopeless for more than a few days. You are having problems with your breasts or breastfeeding. Where can you learn more? Go to http://www.gray.com/  Enter T592 in the search box to learn more about \"Postpartum: Care Instructions. \"  Current as of: February 23, 2022               Content Version: 13.4  © 7080-9858 [employer]. Care instructions adapted under license by [employer] (which disclaims liability or warranty for this information). If you have questions about a medical condition or this instruction, always ask your healthcare professional. [place] [address] any warranty or liability for your use of this information.

## 2022-11-19 NOTE — PROGRESS NOTES
Post-Partum Day Number 2 Progress Note    Phillip Rivera     Assessment: Doing well, post partum day 2 sp TSVD after IOL, c/b uterine inversion and PPH, EBL 1000cc. Postpartum Hgb 7.8. Plan:   - Discharge home today  - Follow up in office in 6 week(s) with [address] medication prescription(s) sent. - Questions answered. Information for the patient's :  Margie Culver [[id number]]   Vaginal, Spontaneous  Patient doing well without significant complaint. Voiding without difficulty, normal lochia. Ready for discharge home. Vitals:  Visit Vitals  /85 (BP 1 Location: Left upper arm, BP Patient Position: At rest)   Pulse 91   Temp 98.8 °F (37.1 °C)   Resp 14   Ht 5' 3\" (1.6 m)   Wt 63.5 kg (140 lb)   SpO2 97%   Breastfeeding Unknown   BMI 24.80 kg/m²     Temp (24hrs), Av.3 °F (36.8 °C), Min:97.8 °F (36.6 °C), Max:98.8 °F (37.1 °C)      Exam:        Patient without distress. Fundus firm, nontender per nursing fundal checks                Perineum with normal lochia noted per nursing assessment                Lower extremities are negative for pathological edema    Labs:     Lab Results   Component Value Date/Time    WBC 11.7 (H) 2022 02:57 AM    WBC 10.6 2022 12:39 PM    WBC 10.1 2022 08:34 PM    HGB 7.8 (L) 2022 02:57 AM    HGB 10.2 (L) 2022 12:39 PM    HGB 11.2 (L) 2022 08:34 PM    HCT 23.2 (L) 2022 02:57 AM    HCT 31.0 (L) 2022 12:39 PM    HCT 34.4 (L) 2022 08:34 PM    PLATELET 329 (L)  02:57 AM    PLATELET 749 (L)  12:39 PM    PLATELET 618 (L)  08:34 PM    Hgb, External 1 2022 12:00 AM       No results found for this or any previous visit (from the past 24 hour(s)).

## 2022-11-19 NOTE — LACTATION NOTE
This note was copied from a baby's chart. 22 2168   Visit Information   Lactation Consult Visit Type IP Consult Follow Up   Visit Length 45 minutes   Referral Received From Lactation Consultant Follow-up   Reason for Visit Normal [place] Visit;Education   Breast- Feeding Assessment   Breast-Feeding Experience No  Equipment: Has a Spectra breast pump; Measured for 22-24mm flanges; Reviewed the pump settings   Breast Assessment   Left Breast Medium   Left Nipple Everted   Right Breast Medium   Right Nipple Everted   Mother/Infant Observation   Mother Observation Close hold;Breast comfortable;Nipple round on release;Recognizes feeding cues   Infant Observation Breast tissue moves; Feeding cues; Frenulum checked; Lips flanged, lower; Lips flanged, upper;Opens mouth  (Oral assessment WDL)   LATCH Documentation   Latch 2   Audible Swallowing 1   Type of Nipple 2   Comfort (Breast/Nipple) 2   Hold (Positioning) 2   LATCH Score 9     Reviewed the typical feeding characteristics in the 2nd DOL and signs of adequate intake. Mother states that breastfeeding has been going well. Observed baby showing signs of transfer on the breast. Discussed hand expressing/manually pumping after breastfeeding. Offer baby EBM  to elicit more wet diapers and stool changing to brown. Discussed a feeding plan and how to manage breast engorgement. Mother's questions were addressed. Plan:  Offer lots of skin to skin and access to the breast.  Feed baby at early signs of hunger every 2-3 hours. Assure a deep latch, check that baby's lips are turned outward and use breast compression to keep baby actively feeding. Pump/hand express after breastfeeding and offer baby EBM. Monitor wet and dirty diapers for signs of adequate intake. Follow instructions for managing engorgement.

## 2022-11-19 NOTE — DISCHARGE SUMMARY
Obstetrical Discharge Summary     Name: Christiano Eastman MRN: [id number]  SSN: [id number]    YOB: 1990  Age: 28 y.o. Sex: female      Admit Date: 2022    Discharge Date: 2022     Admitting Physician: Yusef Martinez MD     Attending Physician:  Karla Bowden MD     Admission Diagnoses: Post-dates pregnancy [O48.0]    Discharge Diagnoses:   Information for the patient's :  Romina Alexander [[id number]]   Delivery of a 3.6 kg female infant via Vaginal, Spontaneous on 2022 at 12:01 PM  by Macey Watts. Apgars were 8  and 9 . Additional Diagnoses:   Hospital Problems  Date Reviewed: 2022            Codes Class Noted POA    Post-dates pregnancy ICD-10-CM: O48.0  ICD-9-CM: 645.10  2022 Unknown          Lab Results   Component Value Date/Time    Rubella, External immune 2022 12:00 AM    GrBStrep, External Negative 10/17/2022 12:00 AM       Hospital Course: Normal hospital course following the delivery. Patient Instructions:   Current Discharge Medication List        START taking these medications    Details   ibuprofen (MOTRIN) 800 mg tablet Take 1 Tablet by mouth every eight (8) hours as needed for Pain. Qty: 60 Tablet, Refills: 0      ferrous sulfate (Slow Fe) 142 mg (45 mg iron) ER tablet Take 1 Tablet by mouth three (3) times daily (with meals). Qty: 90 Tablet, Refills: 0           CONTINUE these medications which have NOT CHANGED    Details   prenatal vit-iron fumarate-fa 27 mg iron- 0.8 mg tab tablet Take 1 Tablet by mouth daily. Disposition at Discharge: Home or self care    Condition at Discharge: Stable    Reference my discharge instructions.     Follow-up Appointments   Procedures    FOLLOW UP VISIT Appointment in: 6 Weeks     Standing Status:   Standing     Number of Occurrences:   1     Order Specific Question:   Appointment in     Answer:   6 Weeks        Signed By:  Tate Sherwood MD     2022

## 2022-11-20 LAB
ABO + RH BLD: NORMAL
ABO + RH BLD: NORMAL
BLD PROD TYP BPU: NORMAL
BLOOD GROUP ANTIBODIES SERPL: NORMAL
BPU ID: NORMAL
CROSSMATCH RESULT,%XM: NORMAL
CROSSMATCH RESULT,%XM: NORMAL
FETAL SCREEN,FMHS: NORMAL
SPECIMEN EXP DATE BLD: NORMAL
STATUS OF UNIT,%ST: NORMAL
UNIT DIVISION, %UDIV: 0

## 2024-11-05 LAB
ABO, EXTERNAL RESULT: NORMAL
C. TRACHOMATIS, EXTERNAL RESULT: NORMAL
HEP B, EXTERNAL RESULT: NON REACTIVE
HIV, EXTERNAL RESULT: NON REACTIVE
N. GONORRHOEAE, EXTERNAL RESULT: NORMAL
RH FACTOR, EXTERNAL RESULT: NORMAL
RUBELLA TITER, EXTERNAL RESULT: NORMAL
T. PALLIDUM (SYPHILIS) ANTIBODY, EXTERNAL RESULT: NON REACTIVE

## 2025-05-07 LAB — GBS, EXTERNAL RESULT: NORMAL

## 2025-05-27 ENCOUNTER — ANESTHESIA (OUTPATIENT)
Dept: LABOR AND DELIVERY | Facility: HOSPITAL | Age: 35
End: 2025-05-27
Payer: COMMERCIAL

## 2025-05-27 ENCOUNTER — HOSPITAL ENCOUNTER (INPATIENT)
Facility: HOSPITAL | Age: 35
LOS: 1 days | Discharge: HOME | End: 2025-05-28
Attending: OBSTETRICS & GYNECOLOGY | Admitting: OBSTETRICS & GYNECOLOGY
Payer: COMMERCIAL

## 2025-05-27 ENCOUNTER — ANESTHESIA EVENT (OUTPATIENT)
Dept: LABOR AND DELIVERY | Facility: HOSPITAL | Age: 35
End: 2025-05-27
Payer: COMMERCIAL

## 2025-05-27 PROBLEM — Z34.80 NORMAL PREGNANCY IN MULTIGRAVIDA: Status: ACTIVE | Noted: 2025-05-27

## 2025-05-27 LAB
ABO + RH BLD: NORMAL
AMPHET UR QL SCN: NEGATIVE
BARBITURATES UR QL SCN: NEGATIVE
BENZODIAZ UR QL: NEGATIVE
BLOOD GROUP ANTIBODIES SERPL: NORMAL
CANNABINOIDS UR QL SCN: NEGATIVE
COCAINE UR QL SCN: NEGATIVE
ERYTHROCYTE [DISTWIDTH] IN BLOOD BY AUTOMATED COUNT: 13.8 % (ref 11.5–14.5)
GLUCOSE BLD STRIP.AUTO-MCNC: 172 MG/DL (ref 65–117)
GLUCOSE BLD STRIP.AUTO-MCNC: 96 MG/DL (ref 65–117)
HCT VFR BLD AUTO: 30.6 % (ref 35–47)
HGB BLD-MCNC: 9.4 G/DL (ref 11.5–16)
Lab: NORMAL
MCH RBC QN AUTO: 25.5 PG (ref 26–34)
MCHC RBC AUTO-ENTMCNC: 30.7 G/DL (ref 30–36.5)
MCV RBC AUTO: 82.9 FL (ref 80–99)
METHADONE UR QL: NEGATIVE
NRBC # BLD: 0 K/UL (ref 0–0.01)
NRBC BLD-RTO: 0 PER 100 WBC
OPIATES UR QL: NEGATIVE
PCP UR QL: NEGATIVE
PLATELET # BLD AUTO: 140 K/UL (ref 150–400)
PMV BLD AUTO: 11.9 FL (ref 8.9–12.9)
RBC # BLD AUTO: 3.69 M/UL (ref 3.8–5.2)
RPR SER QL: NONREACTIVE
SERVICE CMNT-IMP: ABNORMAL
SERVICE CMNT-IMP: NORMAL
SPECIMEN EXP DATE BLD: NORMAL
WBC # BLD AUTO: 6.9 K/UL (ref 3.6–11)

## 2025-05-27 PROCEDURE — 1120000000 HC RM PRIVATE OB

## 2025-05-27 PROCEDURE — 6360000002 HC RX W HCPCS: Performed by: OBSTETRICS & GYNECOLOGY

## 2025-05-27 PROCEDURE — 10D17Z9 MANUAL EXTRACTION OF PRODUCTS OF CONCEPTION, RETAINED, VIA NATURAL OR ARTIFICIAL OPENING: ICD-10-PCS | Performed by: OBSTETRICS & GYNECOLOGY

## 2025-05-27 PROCEDURE — 85027 COMPLETE CBC AUTOMATED: CPT

## 2025-05-27 PROCEDURE — 2500000003 HC RX 250 WO HCPCS: Performed by: ANESTHESIOLOGY

## 2025-05-27 PROCEDURE — 00HU33Z INSERTION OF INFUSION DEVICE INTO SPINAL CANAL, PERCUTANEOUS APPROACH: ICD-10-PCS | Performed by: OBSTETRICS & GYNECOLOGY

## 2025-05-27 PROCEDURE — 86900 BLOOD TYPING SEROLOGIC ABO: CPT

## 2025-05-27 PROCEDURE — 6360000002 HC RX W HCPCS: Performed by: ANESTHESIOLOGY

## 2025-05-27 PROCEDURE — 7220000101 HC DELIVERY VAGINAL/SINGLE

## 2025-05-27 PROCEDURE — 36415 COLL VENOUS BLD VENIPUNCTURE: CPT

## 2025-05-27 PROCEDURE — 7210000100 HC LABOR FEE PER 1 HR

## 2025-05-27 PROCEDURE — 3E033VJ INTRODUCTION OF OTHER HORMONE INTO PERIPHERAL VEIN, PERCUTANEOUS APPROACH: ICD-10-PCS | Performed by: OBSTETRICS & GYNECOLOGY

## 2025-05-27 PROCEDURE — 3700000156 HC EPIDURAL ANESTHESIA

## 2025-05-27 PROCEDURE — 76815 OB US LIMITED FETUS(S): CPT

## 2025-05-27 PROCEDURE — 2500000003 HC RX 250 WO HCPCS: Performed by: OBSTETRICS & GYNECOLOGY

## 2025-05-27 PROCEDURE — 6370000000 HC RX 637 (ALT 250 FOR IP): Performed by: OBSTETRICS & GYNECOLOGY

## 2025-05-27 PROCEDURE — 10907ZC DRAINAGE OF AMNIOTIC FLUID, THERAPEUTIC FROM PRODUCTS OF CONCEPTION, VIA NATURAL OR ARTIFICIAL OPENING: ICD-10-PCS | Performed by: OBSTETRICS & GYNECOLOGY

## 2025-05-27 PROCEDURE — 86850 RBC ANTIBODY SCREEN: CPT

## 2025-05-27 PROCEDURE — 2580000003 HC RX 258: Performed by: OBSTETRICS & GYNECOLOGY

## 2025-05-27 PROCEDURE — 3700000025 EPIDURAL BLOCK: Performed by: ANESTHESIOLOGY

## 2025-05-27 PROCEDURE — 82962 GLUCOSE BLOOD TEST: CPT

## 2025-05-27 PROCEDURE — 86592 SYPHILIS TEST NON-TREP QUAL: CPT

## 2025-05-27 PROCEDURE — 80307 DRUG TEST PRSMV CHEM ANLYZR: CPT

## 2025-05-27 PROCEDURE — 86901 BLOOD TYPING SEROLOGIC RH(D): CPT

## 2025-05-27 PROCEDURE — 4A1HXCZ MONITORING OF PRODUCTS OF CONCEPTION, CARDIAC RATE, EXTERNAL APPROACH: ICD-10-PCS | Performed by: OBSTETRICS & GYNECOLOGY

## 2025-05-27 RX ORDER — OXYCODONE HYDROCHLORIDE 5 MG/1
5 TABLET ORAL EVERY 4 HOURS PRN
Refills: 0 | Status: DISCONTINUED | OUTPATIENT
Start: 2025-05-27 | End: 2025-05-28 | Stop reason: HOSPADM

## 2025-05-27 RX ORDER — SODIUM CHLORIDE, SODIUM LACTATE, POTASSIUM CHLORIDE, CALCIUM CHLORIDE 600; 310; 30; 20 MG/100ML; MG/100ML; MG/100ML; MG/100ML
INJECTION, SOLUTION INTRAVENOUS CONTINUOUS
Status: DISCONTINUED | OUTPATIENT
Start: 2025-05-27 | End: 2025-05-27

## 2025-05-27 RX ORDER — SODIUM CHLORIDE 9 MG/ML
INJECTION, SOLUTION INTRAVENOUS PRN
Status: DISCONTINUED | OUTPATIENT
Start: 2025-05-27 | End: 2025-05-28 | Stop reason: HOSPADM

## 2025-05-27 RX ORDER — SODIUM CHLORIDE 0.9 % (FLUSH) 0.9 %
5-40 SYRINGE (ML) INJECTION EVERY 12 HOURS SCHEDULED
Status: DISCONTINUED | OUTPATIENT
Start: 2025-05-27 | End: 2025-05-27

## 2025-05-27 RX ORDER — DOCUSATE SODIUM 100 MG/1
100 CAPSULE, LIQUID FILLED ORAL 2 TIMES DAILY
Status: DISCONTINUED | OUTPATIENT
Start: 2025-05-27 | End: 2025-05-28 | Stop reason: HOSPADM

## 2025-05-27 RX ORDER — SODIUM CHLORIDE 0.9 % (FLUSH) 0.9 %
5-40 SYRINGE (ML) INJECTION EVERY 12 HOURS SCHEDULED
Status: DISCONTINUED | OUTPATIENT
Start: 2025-05-27 | End: 2025-05-28 | Stop reason: HOSPADM

## 2025-05-27 RX ORDER — MISOPROSTOL 200 UG/1
800 TABLET ORAL PRN
Status: DISCONTINUED | OUTPATIENT
Start: 2025-05-27 | End: 2025-05-28 | Stop reason: HOSPADM

## 2025-05-27 RX ORDER — MISOPROSTOL 200 UG/1
200 TABLET ORAL PRN
Status: DISCONTINUED | OUTPATIENT
Start: 2025-05-27 | End: 2025-05-28 | Stop reason: HOSPADM

## 2025-05-27 RX ORDER — METHYLERGONOVINE MALEATE 0.2 MG/ML
200 INJECTION INTRAVENOUS PRN
Status: DISCONTINUED | OUTPATIENT
Start: 2025-05-27 | End: 2025-05-27

## 2025-05-27 RX ORDER — NALOXONE HYDROCHLORIDE 0.4 MG/ML
INJECTION, SOLUTION INTRAMUSCULAR; INTRAVENOUS; SUBCUTANEOUS PRN
Status: DISCONTINUED | OUTPATIENT
Start: 2025-05-27 | End: 2025-05-27

## 2025-05-27 RX ORDER — FENTANYL/BUPIVACAINE/NS/PF 2-1250MCG
12 PLASTIC BAG, INJECTION (ML) INJECTION CONTINUOUS
Refills: 0 | Status: DISCONTINUED | OUTPATIENT
Start: 2025-05-27 | End: 2025-05-27

## 2025-05-27 RX ORDER — CARBOPROST TROMETHAMINE 250 UG/ML
250 INJECTION, SOLUTION INTRAMUSCULAR PRN
Status: DISCONTINUED | OUTPATIENT
Start: 2025-05-27 | End: 2025-05-27

## 2025-05-27 RX ORDER — SIMETHICONE 80 MG
80 TABLET,CHEWABLE ORAL EVERY 6 HOURS PRN
Status: DISCONTINUED | OUTPATIENT
Start: 2025-05-27 | End: 2025-05-28 | Stop reason: HOSPADM

## 2025-05-27 RX ORDER — ONDANSETRON 2 MG/ML
4 INJECTION INTRAMUSCULAR; INTRAVENOUS EVERY 6 HOURS PRN
Status: DISCONTINUED | OUTPATIENT
Start: 2025-05-27 | End: 2025-05-27

## 2025-05-27 RX ORDER — METHYLERGONOVINE MALEATE 0.2 MG/ML
200 INJECTION INTRAVENOUS PRN
Status: DISCONTINUED | OUTPATIENT
Start: 2025-05-27 | End: 2025-05-28 | Stop reason: HOSPADM

## 2025-05-27 RX ORDER — SODIUM CHLORIDE, SODIUM LACTATE, POTASSIUM CHLORIDE, AND CALCIUM CHLORIDE .6; .31; .03; .02 G/100ML; G/100ML; G/100ML; G/100ML
500 INJECTION, SOLUTION INTRAVENOUS PRN
Status: DISCONTINUED | OUTPATIENT
Start: 2025-05-27 | End: 2025-05-27

## 2025-05-27 RX ORDER — TERBUTALINE SULFATE 1 MG/ML
0.25 INJECTION SUBCUTANEOUS
Status: DISCONTINUED | OUTPATIENT
Start: 2025-05-27 | End: 2025-05-27

## 2025-05-27 RX ORDER — SODIUM CHLORIDE 0.9 % (FLUSH) 0.9 %
5-40 SYRINGE (ML) INJECTION PRN
Status: DISCONTINUED | OUTPATIENT
Start: 2025-05-27 | End: 2025-05-27

## 2025-05-27 RX ORDER — SODIUM CHLORIDE, SODIUM LACTATE, POTASSIUM CHLORIDE, CALCIUM CHLORIDE 600; 310; 30; 20 MG/100ML; MG/100ML; MG/100ML; MG/100ML
INJECTION, SOLUTION INTRAVENOUS CONTINUOUS
Status: DISCONTINUED | OUTPATIENT
Start: 2025-05-27 | End: 2025-05-27 | Stop reason: SDUPTHER

## 2025-05-27 RX ORDER — SODIUM CHLORIDE 9 MG/ML
25 INJECTION, SOLUTION INTRAVENOUS PRN
Status: DISCONTINUED | OUTPATIENT
Start: 2025-05-27 | End: 2025-05-27

## 2025-05-27 RX ORDER — ACETAMINOPHEN 500 MG
1000 TABLET ORAL EVERY 8 HOURS SCHEDULED
Status: DISCONTINUED | OUTPATIENT
Start: 2025-05-27 | End: 2025-05-28 | Stop reason: HOSPADM

## 2025-05-27 RX ORDER — SODIUM CHLORIDE 0.9 % (FLUSH) 0.9 %
5-40 SYRINGE (ML) INJECTION PRN
Status: DISCONTINUED | OUTPATIENT
Start: 2025-05-27 | End: 2025-05-28 | Stop reason: HOSPADM

## 2025-05-27 RX ORDER — ONDANSETRON 4 MG/1
4 TABLET, ORALLY DISINTEGRATING ORAL EVERY 8 HOURS PRN
Status: DISCONTINUED | OUTPATIENT
Start: 2025-05-27 | End: 2025-05-27

## 2025-05-27 RX ORDER — EPHEDRINE SULFATE/0.9% NACL/PF 25 MG/5 ML
10 SYRINGE (ML) INTRAVENOUS
Status: DISCONTINUED | OUTPATIENT
Start: 2025-05-27 | End: 2025-05-27

## 2025-05-27 RX ORDER — ONDANSETRON 4 MG/1
4 TABLET, ORALLY DISINTEGRATING ORAL EVERY 6 HOURS PRN
Status: DISCONTINUED | OUTPATIENT
Start: 2025-05-27 | End: 2025-05-28 | Stop reason: HOSPADM

## 2025-05-27 RX ORDER — MISOPROSTOL 200 UG/1
400 TABLET ORAL PRN
Status: DISCONTINUED | OUTPATIENT
Start: 2025-05-27 | End: 2025-05-27

## 2025-05-27 RX ORDER — IBUPROFEN 400 MG/1
800 TABLET, FILM COATED ORAL EVERY 8 HOURS SCHEDULED
Status: DISCONTINUED | OUTPATIENT
Start: 2025-05-27 | End: 2025-05-28 | Stop reason: HOSPADM

## 2025-05-27 RX ORDER — ONDANSETRON 2 MG/ML
4 INJECTION INTRAMUSCULAR; INTRAVENOUS EVERY 6 HOURS PRN
Status: DISCONTINUED | OUTPATIENT
Start: 2025-05-27 | End: 2025-05-28 | Stop reason: HOSPADM

## 2025-05-27 RX ORDER — BUPIVACAINE HYDROCHLORIDE AND EPINEPHRINE 2.5; 5 MG/ML; UG/ML
INJECTION, SOLUTION EPIDURAL; INFILTRATION; INTRACAUDAL; PERINEURAL
Status: DISCONTINUED | OUTPATIENT
Start: 2025-05-27 | End: 2025-05-27 | Stop reason: SDUPTHER

## 2025-05-27 RX ADMIN — ACETAMINOPHEN 1000 MG: 500 TABLET ORAL at 21:41

## 2025-05-27 RX ADMIN — OXYTOCIN: 10 INJECTION, SOLUTION INTRAMUSCULAR; INTRAVENOUS at 12:25

## 2025-05-27 RX ADMIN — METHYLERGONOVINE MALEATE 200 MCG: 0.2 INJECTION, SOLUTION INTRAMUSCULAR; INTRAVENOUS at 12:30

## 2025-05-27 RX ADMIN — BUPIVACAINE HYDROCHLORIDE AND EPINEPHRINE 10 ML: 2.5; 5 INJECTION, SOLUTION EPIDURAL; INFILTRATION; INTRACAUDAL; PERINEURAL at 09:20

## 2025-05-27 RX ADMIN — CEFAZOLIN 2000 MG: 1 INJECTION, POWDER, FOR SOLUTION INTRAMUSCULAR; INTRAVENOUS at 13:16

## 2025-05-27 RX ADMIN — OXYTOCIN 2 MILLI-UNITS/MIN: 10 INJECTION, SOLUTION INTRAMUSCULAR; INTRAVENOUS at 07:22

## 2025-05-27 RX ADMIN — ONDANSETRON 4 MG: 2 INJECTION, SOLUTION INTRAMUSCULAR; INTRAVENOUS at 13:34

## 2025-05-27 RX ADMIN — DOCUSATE SODIUM 100 MG: 100 CAPSULE, LIQUID FILLED ORAL at 20:28

## 2025-05-27 RX ADMIN — TRANEXAMIC ACID 1000 MG: 1 INJECTION, SOLUTION INTRAVENOUS at 13:21

## 2025-05-27 RX ADMIN — SODIUM CHLORIDE, SODIUM LACTATE, POTASSIUM CHLORIDE, AND CALCIUM CHLORIDE: .6; .31; .03; .02 INJECTION, SOLUTION INTRAVENOUS at 07:24

## 2025-05-27 RX ADMIN — Medication 12 ML/HR: at 09:47

## 2025-05-27 RX ADMIN — SODIUM CHLORIDE, PRESERVATIVE FREE 10 ML: 5 INJECTION INTRAVENOUS at 20:28

## 2025-05-27 ASSESSMENT — PAIN SCALES - GENERAL
PAINLEVEL_OUTOF10: 2
PAINLEVEL_OUTOF10: 0

## 2025-05-27 ASSESSMENT — PAIN DESCRIPTION - DESCRIPTORS: DESCRIPTORS: CRAMPING

## 2025-05-27 ASSESSMENT — PAIN - FUNCTIONAL ASSESSMENT: PAIN_FUNCTIONAL_ASSESSMENT: ACTIVITIES ARE NOT PREVENTED

## 2025-05-27 ASSESSMENT — PAIN DESCRIPTION - ORIENTATION: ORIENTATION: LOWER;MID

## 2025-05-27 ASSESSMENT — PAIN DESCRIPTION - LOCATION: LOCATION: ABDOMEN

## 2025-05-27 NOTE — PROGRESS NOTES
Comfortable after epidural  /70   Pulse 69   Temp 98.1 °F (36.7 °C) (Oral)   Resp 16   SpO2 100%    cat I  SVE 6/70/0  AROM clear fluid  [place] q 4 min on 12 pit    - continue pitocin. Anticipate       ADDENDUM 11:20  /66   Pulse 71   Temp 98.2 °F (36.8 °C) (Oral)   Resp 16   SpO2 100%   , moderate variability, earlys and variables with contractions  SVE 7/70/0    - entering active labor  - anticipate       ADDENDUM 11:40  Feeling pain with contractions  SVE 9/c/0    - anticipate  soon

## 2025-05-27 NOTE — H&P
History & Physical    Name: Rosio Paniagua MRN: [id number]  SSN: [id number]    YOB: 1990  Age: 34 y.o.  Sex: female      Subjective:     Estimated Date of Delivery: 25  OB History    Para Term  AB Living   2  1   1   SAB IAB Ectopic Molar Multiple Live Births              # Outcome Date GA Lbr Néstor/2nd Weight Sex Type Anes PTL Lv   1 Current                Ms. Paniagua is a 33 yo  @ 39w2d who presents for IOL in the setting of GDM and marginal cord insertion.      Prenatal course was complicated by:  past pregnancy history of postpartum hemorrhage  Uterine inversion    RhD negative  rhogam_(given 3/11)_    placental abnormality  bilobed placenta with cord insertion on edge of anterior placenta (marginal cord) growth q 4__    Anemia - hgb 10.3 3rd tri    gestational diabetes mellitus class A1      Pmhx: none    Past Surgical History:   Procedure Laterality Date    OTHER SURGICAL HISTORY     Jaw surgery  Pectus excavatum surgery via elizabeth procedure    Social History     Occupational History    Not on file   Tobacco Use    Smoking status: Never    Smokeless tobacco: Never   Substance and Sexual Activity    Alcohol use: Not Currently    Drug use: Never    Sexual activity: Not on file     No family history on file.    No Known Allergies  Prior to Admission medications    Not on File        Review of Systems: A comprehensive review of systems was negative except for that written in the History of Present Illness.    Objective:     Vitals:  Vitals:    25 0556 25 0600 25 0601 25 0606   BP:  115/79     Pulse: 77 83     Temp:       TempSrc:       SpO2: 100%  100% 100%         /79   Pulse 83   Temp 98.1 °F (36.7 °C) (Oral)   SpO2 100%     General:   alert, appears stated age, and cooperative   Lungs:   Normal respiratory effort   Heart:   Regular rate and rhythm   Abdomen:  Gravid, non-tender   Gyn:   Cervix: 4-5/70/-1 in office  Membranes: intact    NST:

## 2025-05-27 NOTE — ANESTHESIA PROCEDURE NOTES
Epidural Block    Patient location during procedure: OB  Start time: 5/27/2025 9:10 AM  End time: 5/27/2025 9:20 AM  Reason for block: labor epidural  Staffing  Performed: anesthesiologist   Anesthesiologist: Enrrique Nickerson MD  Performed by: Enrrique Nickerson MD  Authorized by: Enrrique Nickerson MD    Epidural  Patient position: sitting  Prep: DuraPrep  Patient monitoring: continuous pulse ox and frequent blood pressure checks  Approach: midline  Location: L3-4  Injection technique: NICOLE air  Provider prep: mask and sterile gloves  Needle  Needle type: Tuohy   Needle gauge: 18 G  Needle length: 3.5 in  Catheter type: end hole  Catheter size: 20 G  Test dose: negativeCatheter Secured: tegaderm and tape  Assessment  Sensory level: T8  Hemodynamics: stable  Attempts: 1  Outcomes: uncomplicated and patient tolerated procedure well  Preanesthetic Checklist  Completed: patient identified, IV checked, site marked, risks and benefits discussed, surgical/procedural consents, equipment checked, pre-op evaluation, timeout performed, anesthesia consent given, oxygen available, monitors applied/VS acknowledged, fire risk safety assessment completed and verbalized and blood product R/B/A discussed and consented

## 2025-05-27 NOTE — ANESTHESIA POSTPROCEDURE EVALUATION
Department of Anesthesiology  Postprocedure Note    Patient: Rosio Paniagua  MRN: [id number]  YOB: 1990  Date of evaluation: 5/27/2025    Procedure Summary       Date: 05/27/25 Room / Location:     Anesthesia Start: 0919 Anesthesia Stop: 1252    Procedure: Labor Analgesia Diagnosis:     Scheduled Providers:  Responsible Provider: Enrrique Nickerson MD    Anesthesia Type: epidural ASA Status: 2            Anesthesia Type: No value filed.    Misael Phase I:      Misael Phase II:      Anesthesia Post Evaluation    Patient location during evaluation: PACU  Patient participation: complete - patient participated  Level of consciousness: sleepy but conscious and responsive to verbal stimuli  Pain score: 0  Airway patency: patent  Nausea & Vomiting: no vomiting and no nausea  Cardiovascular status: blood pressure returned to baseline and hemodynamically stable  Respiratory status: acceptable  Hydration status: stable  Multimodal analgesia pain management approach  Pain management: adequate    No notable events documented.

## 2025-05-27 NOTE — ANESTHESIA PRE PROCEDURE
NOTIFICATION RETURN TO WORK / SCHOOL 
 
12/7/2017 2:00 PM 
 
Ms. Tom Mistry 
[facility] [phone] To Whom It May Concern: 
 
Tom Mistry is currently under the care of [facility]. She will return to work/school on: 12-8-17 Will need to see orthopedic MD in the near future for shoulder pain. If there are questions or concerns please have the patient contact our office.  
 
 
 
Sincerely, 
 
 
Renuka Hunter MD 
 
                                
 
     BMI:   Wt Readings from Last 3 Encounters:   No data found for Wt     There is no height or weight on file to calculate BMI.    CBC:   Lab Results   Component Value Date/Time    WBC 6.9 05/27/2025 05:36 AM    RBC 3.69 05/27/2025 05:36 AM    HGB 9.4 05/27/2025 05:36 AM    HCT 30.6 05/27/2025 05:36 AM    MCV 82.9 05/27/2025 05:36 AM    RDW 13.8 05/27/2025 05:36 AM     05/27/2025 05:36 AM       CMP: No results found for: \"NA\", \"K\", \"CL\", \"CO2\", \"BUN\", \"CREATININE\", \"GFRAA\", \"AGRATIO\", \"LABGLOM\", \"GLUCOSE\", \"GLU\", \"CALCIUM\", \"BILITOT\", \"ALKPHOS\", \"AST\", \"ALT\"    POC Tests:   Recent Labs     05/27/25  0612   POCGLU 96       Coags: No results found for: \"PROTIME\", \"INR\", \"APTT\"    HCG (If Applicable): No results found for: \"PREGTESTUR\", \"PREGSERUM\", \"HCG\", \"HCGQUANT\"     ABGs: No results found for: \"PHART\", \"PO2ART\", \"WBC4YNF\", \"FSQ8NON\", \"BEART\", \"T6KLOZGO\"     Type & Screen (If Applicable):  Lab Results   Component Value Date    ABORH O NEGATIVE 05/27/2025    LABANTI NEG 05/27/2025       Drug/Infectious Status (If Applicable):  No results found for: \"HIV\", \"HEPCAB\"    COVID-19 Screening (If Applicable): No results found for: \"COVID19\"        Anesthesia Evaluation  Patient summary reviewed and Nursing notes reviewed  Airway: Mallampati: II       Mouth opening: > = 3 FB   Dental: normal exam         Pulmonary:Negative Pulmonary ROS and normal exam  breath sounds clear to auscultation                             Cardiovascular:Negative CV ROS            Rhythm: regular  Rate: normal                    Neuro/Psych:   Negative Neuro/Psych ROS              GI/Hepatic/Renal: Neg GI/Hepatic/Renal ROS            Endo/Other: Negative Endo/Other ROS                    Abdominal:             Vascular: negative vascular ROS.         Other Findings:       Anesthesia Plan      epidural     ASA 2       Induction: intravenous.      Anesthetic plan and risks discussed with patient.                    Enrrique Nickerson MD

## 2025-05-27 NOTE — PROGRESS NOTES
Nutrition: Referral received for gestational DM AND <34 weeks gestation. Patient admitted at 39+ weeks for IOL. Nutrition assessment not indicated. Thanks.  Mable Aguirre, RD  Ext [phone]

## 2025-05-28 VITALS
HEART RATE: 68 BPM | BODY MASS INDEX: 25.76 KG/M2 | HEIGHT: 62 IN | WEIGHT: 140 LBS | SYSTOLIC BLOOD PRESSURE: 107 MMHG | TEMPERATURE: 97.7 F | OXYGEN SATURATION: 100 % | DIASTOLIC BLOOD PRESSURE: 67 MMHG | RESPIRATION RATE: 15 BRPM

## 2025-05-28 LAB
ERYTHROCYTE [DISTWIDTH] IN BLOOD BY AUTOMATED COUNT: 13.9 % (ref 11.5–14.5)
HCT VFR BLD AUTO: 25 % (ref 35–47)
HGB BLD-MCNC: 7.3 G/DL (ref 11.5–16)
MCH RBC QN AUTO: 25.2 PG (ref 26–34)
MCHC RBC AUTO-ENTMCNC: 29.2 G/DL (ref 30–36.5)
MCV RBC AUTO: 86.2 FL (ref 80–99)
NRBC # BLD: 0 K/UL (ref 0–0.01)
NRBC BLD-RTO: 0 PER 100 WBC
PLATELET # BLD AUTO: 115 K/UL (ref 150–400)
PMV BLD AUTO: 12.2 FL (ref 8.9–12.9)
RBC # BLD AUTO: 2.9 M/UL (ref 3.8–5.2)
WBC # BLD AUTO: 11 K/UL (ref 3.6–11)

## 2025-05-28 PROCEDURE — 36415 COLL VENOUS BLD VENIPUNCTURE: CPT

## 2025-05-28 PROCEDURE — 96372 THER/PROPH/DIAG INJ SC/IM: CPT

## 2025-05-28 PROCEDURE — 6360000002 HC RX W HCPCS: Performed by: OBSTETRICS & GYNECOLOGY

## 2025-05-28 PROCEDURE — 6370000000 HC RX 637 (ALT 250 FOR IP): Performed by: OBSTETRICS & GYNECOLOGY

## 2025-05-28 PROCEDURE — 85461 HEMOGLOBIN FETAL: CPT

## 2025-05-28 PROCEDURE — 86901 BLOOD TYPING SEROLOGIC RH(D): CPT

## 2025-05-28 PROCEDURE — 86900 BLOOD TYPING SEROLOGIC ABO: CPT

## 2025-05-28 PROCEDURE — 85027 COMPLETE CBC AUTOMATED: CPT

## 2025-05-28 RX ORDER — METFORMIN HYDROCHLORIDE 500 MG/1
500 TABLET, EXTENDED RELEASE ORAL 2 TIMES DAILY
Qty: 60 TABLET | Refills: 5 | Status: SHIPPED | OUTPATIENT
Start: 2025-05-28

## 2025-05-28 RX ADMIN — ACETAMINOPHEN 1000 MG: 500 TABLET ORAL at 05:21

## 2025-05-28 RX ADMIN — DOCUSATE SODIUM 100 MG: 100 CAPSULE, LIQUID FILLED ORAL at 08:19

## 2025-05-28 RX ADMIN — IBUPROFEN 800 MG: 400 TABLET ORAL at 00:28

## 2025-05-28 RX ADMIN — IRON SUCROSE 200 MG: 20 INJECTION, SOLUTION INTRAVENOUS at 10:30

## 2025-05-28 RX ADMIN — HUMAN RHO(D) IMMUNE GLOBULIN 300 MCG: 1500 SOLUTION INTRAMUSCULAR; INTRAVENOUS at 12:37

## 2025-05-28 RX ADMIN — ACETAMINOPHEN 1000 MG: 500 TABLET ORAL at 13:44

## 2025-05-28 RX ADMIN — IBUPROFEN 800 MG: 400 TABLET ORAL at 08:19

## 2025-05-28 ASSESSMENT — PAIN - FUNCTIONAL ASSESSMENT
PAIN_FUNCTIONAL_ASSESSMENT: ACTIVITIES ARE NOT PREVENTED
PAIN_FUNCTIONAL_ASSESSMENT: ACTIVITIES ARE NOT PREVENTED

## 2025-05-28 ASSESSMENT — PAIN SCALES - GENERAL
PAINLEVEL_OUTOF10: 2
PAINLEVEL_OUTOF10: 2

## 2025-05-28 ASSESSMENT — PAIN DESCRIPTION - DESCRIPTORS
DESCRIPTORS: CRAMPING
DESCRIPTORS: CRAMPING

## 2025-05-28 ASSESSMENT — PAIN DESCRIPTION - ORIENTATION
ORIENTATION: LOWER
ORIENTATION: LOWER

## 2025-05-28 ASSESSMENT — PAIN DESCRIPTION - LOCATION
LOCATION: ABDOMEN
LOCATION: ABDOMEN

## 2025-05-28 NOTE — DISCHARGE INSTRUCTIONS
POST DELIVERY DISCHARGE INSTRUCTIONS    Name: Rosio Paniagua  YOB: 1990  Primary Diagnosis: [unfilled]    General:     Diet/Diet Restrictions:  Eight 8-ounce glasses of fluid daily  ; avoid excessive caffeine intake.  Meals/snacks as desired which are high in fiber and healthy fats.  Medications:   {Medication reconciliation information is now added to the patient's AVS automatically when it is printed.  There is no need to use this SmartLink in discharge instructions.  Highlight this text and delete it to clear this message}      Physical Activity / Restrictions / Safety:     Avoid heavy lifting, no more that 8 lbs. For 2-3 weeks;   Limit use of stairs to 2 times daily for the first week home.   No driving for one week.  Avoid intercourse 4-6 weeks, no douching or tampon use.   Check with obstetrician before starting or resuming an exercise program.      Discharge Instructions/Special Treatment/Home Care Needs:     Continue prenatal vitamins.  Continue to use squirt bottle with warm water on your episiotomy after each bathroom use until bleeding stops.  If steri-strips applied to your incision, remove in 7-10 days.    Call your doctor for the following:     Fever over 101 degrees by mouth.  Vaginal bleeding heavier than a normal menstrual period or clots larger than a golf ball.  Red streaks or increased swelling of legs, painful red streaks on your breast.  Painful urination, constipation and increased pain or swelling or discharge with your incision.  If you feel extremely anxious or overwhelmed.  If you have thoughts of harming yourself and/or your baby.    Pain Management:     Take Acetaminophen (Tylenol) or Ibuprofen (Advil, Motrin), as directed for pain.   Use a warm Sitz bath 3 times daily to relieve episiotomy or hemorrhoidal discomfort.   For hemorrhoidal discomfort, use Tucks and Anusol cream as needed and directed.  Heating pad to  incision as needed.     Follow-Up Care:

## 2025-05-28 NOTE — LACTATION NOTE
This note was copied from a baby's chart.     25 3098   Visit Information   Lactation Consult Visit Type IP Initial Consult   Visit Length 30 minutes   Reason for Visit Normal  Visit;Education   Breast Feeding History/Assessment   Left Breast Soft  (Scant drop of colostrum expressed)   Left Nipple Protrude   Right Nipple Protrude   Right Breast Soft  (Scant drop of colostrum expressed)   Breastfeeding History Yes  (Initially  first baby (now 2yrs), however was unable to produce enough milk. Mother feels she may have insufficient glandular tissue.)   Feeding Assessment: Infant Factors   Infant Supplementation Formula   Care Plan/Breast Care   Breast Care Using breast pump;Lanolin provided   Lactation Comment Baby is about 24 hours at this time. Planning for bath soon and then discharge. Latch not assessed at this time. Observed that baby has a smaller mouth for mother's nipples. Possibly the latch may be shallow and less effective. Recommended hand expression following each feeding. Pumping if baby is being supplemented.  Equipment: SCRM Z2. Measured for 21mm flanges. Discussed proper flange fit and recommended moving up to the 24mm if the 21s are tight.  [place] oral assessment: Narrow gape, otherwise WDL     Reviewed the \"Your Guide to Breastfeeding\" booklet. Discussed the typical feeding characteristics in the 1st and 2nd DOL and signs of adequate intake. Discussed a feeding plan and mother's questions were addressed.    Plan:  Offer lots of skin to skin and access to the breast.  Feed baby at early signs of hunger every 2-3 hours.  Assure a deep latch, check that baby's lips are turned outward and use breast compression to keep baby actively feeding.  Pump/hand express for poor feeds and offer baby EBM.  Monitor wet and dirty diapers for signs of adequate intake.

## 2025-05-28 NOTE — PROGRESS NOTES
Post-Partum Day Number 1 Progress Note    Rosio Paniagua     Assessment:   post partum day 1 s/p  with banjo curettage for retained placenta. Bleeding currently appropriate. Pt desires dc home today.     Anemia Hgb 9.4 to 7.3.  Stable vital signs.  Will give a dose of IV Venofer prior to discharge.    Gest thrombocytopenia.  Platelets 140K to 115K.  No intervention needed currently.    Gest DM, diet controlled during pregnancy.  With last baby unable to make sufficient breast milk.  Willing to try metformin to see if this helps.    Plan:  - Continue routine postpartum and perineal care as well as maternal education.  --see plan above.  - Plan discharge home today.    Information for the patient's :  REBEKAH Paniagua [[id number]]   Vaginal, Spontaneous Patient doing well without significant complaint.  Voiding without difficulty, normal lochia.    Vitals:  /67   Pulse 68   Temp 97.7 °F (36.5 °C) (Oral)   Resp 15   Ht 1.575 m (5' 2\")   Wt 63.5 kg (140 lb)   SpO2 100%   Breastfeeding Unknown   BMI 25.61 kg/m²   Temp (24hrs), Av.1 °F (36.7 °C), Min:97.7 °F (36.5 °C), Max:98.2 °F (36.8 °C)        Exam:   Patient without distress.                  Fundus firm, nontender per nursing fundal checks.                Perineum with normal lochia noted per nursing assessment.                Lower extremities are negative for pathological edema.    Labs:     Lab Results   Component Value Date/Time    WBC 11.0 2025 04:52 AM    WBC 6.9 2025 05:36 AM    WBC 11.7 2022 02:57 AM    WBC 10.6 2022 12:39 PM    WBC 10.1 2022 08:34 PM    HGB 7.3 2025 04:52 AM    HGB 9.4 2025 05:36 AM    HGB 7.8 2022 02:57 AM    HGB 10.2 2022 12:39 PM    HGB 11.2 2022 08:34 PM    HCT 25.0 2025 04:52 AM    HCT 30.6 2025 05:36 AM    HCT 23.2 2022 02:57 AM    HCT 31.0 2022 12:39 PM    HCT 34.4 2022 08:34 PM     2025 04:52 AM

## 2025-05-28 NOTE — DISCHARGE SUMMARY
Obstetrical Discharge Summary     Name: Rosio Paniagua MRN: [id number]  SSN: [id number]    YOB: 1990  Age: 34 y.o.  Sex: female      Admit Date: 2025    Discharge Date: 2025     Admitting Physician: Kelly Guajardo MD     Attending Physician:  Kelly Guajardo, *     Admission Diagnoses: Normal pregnancy in multigravida [Z34.80]    Discharge Diagnoses:   Information for the patient's :  REBEKAH Paniagua [[id number]]   @[id number]@     Additional Diagnoses:  No components found for: \"OBEXTABORH\", \"OBEXTABSCRN\", \"OBEXTRUBELLA\", \"OBEXTGRBS\"    Hospital Course: Postpartum course was complicated by acute blood loss anemia, which added 0 days to the patient's length of stay.      Patient Instructions:   Current Discharge Medication List        START taking these medications    Details   metFORMIN (GLUCOPHAGE-XR) 500 MG extended release tablet Take 1 tablet by mouth 2 times daily  Qty: 60 tablet, Refills: 5           STOP taking these medications       ferrous sulfate (SLOW FE) 142 (45 Fe) MG extended release tablet Comments:   Reason for Stopping:         ibuprofen (ADVIL;MOTRIN) 800 MG tablet Comments:   Reason for Stopping:               Disposition at Discharge: Home or self care    Condition at Discharge: Stable    Reference my discharge instructions.    No follow-ups on file.     Signed By:  Tasia Cortez MD     May 28, 2025

## 2025-05-28 NOTE — PROGRESS NOTES
1630: Discharge teaching given to patient. Patient acknowledged understanding of instructions, when to call provider or 911, when to go to follow up, and how to care for self at home. Patient had no questions and is comfortable going home.

## 2025-05-29 LAB
ABO + RH BLD: NORMAL
BLD PROD TYP BPU: NORMAL
BLOOD BANK BLOOD PRODUCT EXPIRATION DATE: NORMAL
BLOOD BANK DISPENSE STATUS: NORMAL
BPU ID: NORMAL
FETAL SCREEN: NORMAL
UNIT DIVISION: 0
UNIT ISSUE DATE/TIME: NORMAL